# Patient Record
Sex: FEMALE | Race: WHITE | NOT HISPANIC OR LATINO | Employment: UNEMPLOYED | URBAN - METROPOLITAN AREA
[De-identification: names, ages, dates, MRNs, and addresses within clinical notes are randomized per-mention and may not be internally consistent; named-entity substitution may affect disease eponyms.]

---

## 2021-02-19 ENCOUNTER — OFFICE VISIT (OUTPATIENT)
Dept: INTERNAL MEDICINE | Facility: CLINIC | Age: 31
End: 2021-02-19

## 2021-02-19 VITALS
DIASTOLIC BLOOD PRESSURE: 60 MMHG | HEIGHT: 64 IN | OXYGEN SATURATION: 99 % | HEART RATE: 85 BPM | BODY MASS INDEX: 21.85 KG/M2 | SYSTOLIC BLOOD PRESSURE: 100 MMHG | WEIGHT: 128 LBS

## 2021-02-19 DIAGNOSIS — K21.9 GASTROESOPHAGEAL REFLUX DISEASE WITHOUT ESOPHAGITIS: ICD-10-CM

## 2021-02-19 DIAGNOSIS — R19.5 STOOL MUCUS: ICD-10-CM

## 2021-02-19 DIAGNOSIS — R11.2 NON-INTRACTABLE VOMITING WITH NAUSEA, UNSPECIFIED VOMITING TYPE: ICD-10-CM

## 2021-02-19 DIAGNOSIS — R10.9 ABDOMINAL CRAMPING: Primary | ICD-10-CM

## 2021-02-19 PROCEDURE — 99203 OFFICE O/P NEW LOW 30 MIN: CPT | Performed by: NURSE PRACTITIONER

## 2021-02-19 RX ORDER — HYDROXYZINE PAMOATE 25 MG/1
CAPSULE ORAL
COMMUNITY
Start: 2021-01-14 | End: 2021-03-17

## 2021-02-19 RX ORDER — PROMETHAZINE HYDROCHLORIDE 25 MG/1
TABLET ORAL
COMMUNITY
Start: 2021-01-14 | End: 2021-02-19 | Stop reason: SDUPTHER

## 2021-02-19 RX ORDER — PROMETHAZINE HYDROCHLORIDE 12.5 MG/1
TABLET ORAL
COMMUNITY
Start: 2020-11-22 | End: 2021-02-19 | Stop reason: DRUGHIGH

## 2021-02-19 RX ORDER — PROMETHAZINE HYDROCHLORIDE 25 MG/1
25 TABLET ORAL EVERY 8 HOURS PRN
Qty: 60 TABLET | Refills: 1 | Status: SHIPPED | OUTPATIENT
Start: 2021-02-19 | End: 2021-03-17

## 2021-02-19 RX ORDER — ONDANSETRON 4 MG/1
4 TABLET, ORALLY DISINTEGRATING ORAL EVERY 8 HOURS PRN
Qty: 60 TABLET | Refills: 1 | Status: SHIPPED | OUTPATIENT
Start: 2021-02-19 | End: 2021-04-06 | Stop reason: SDUPTHER

## 2021-02-19 RX ORDER — HYDROXYZINE HYDROCHLORIDE 10 MG/1
10 TABLET, FILM COATED ORAL EVERY 8 HOURS PRN
COMMUNITY
Start: 2020-11-22 | End: 2021-02-19 | Stop reason: DRUGHIGH

## 2021-02-19 RX ORDER — OMEPRAZOLE 20 MG/1
20 CAPSULE, DELAYED RELEASE ORAL
Qty: 30 CAPSULE | Refills: 2 | Status: SHIPPED | OUTPATIENT
Start: 2021-02-19 | End: 2021-03-19 | Stop reason: SDUPTHER

## 2021-02-19 RX ORDER — ONDANSETRON 4 MG/1
TABLET, ORALLY DISINTEGRATING ORAL
COMMUNITY
Start: 2021-01-14 | End: 2021-02-19 | Stop reason: SDUPTHER

## 2021-02-19 NOTE — PROGRESS NOTES
Chief Complaint   Patient presents with   • Establish Care   • Nausea     with abdominal Pain  Since 06/20   • Vomiting   • Constipation     alternates with diarrhea       History of Present Illness    30 y.o.female presents for establish care and GI problems.  No recent pcp services.  Nausea random with abd cramping. In mornings goes back and forth between constipation and loose stools; in afternoon only mucus for stool.  Sometimes vomiting; when really bad will have constipation mucus and vomiting all at same time and can't get up from toilet.  Onset since early June. Has treated 5 times [facility]; mult labs xrays.  Told symptoms likely related to THC. Pt states she smokes thc to help relieve nausea.  abd pain is mostly lower abd pain cramping. ;   Indigestion and heartburn. No melena. Does have hemorrhoids.      Review of Systems   Constitutional: Positive for appetite change, chills, fatigue and unexpected weight loss. Negative for fever.   Respiratory: Negative for shortness of breath.    Cardiovascular: Positive for palpitations. Negative for chest pain.   Gastrointestinal: Positive for abdominal pain, constipation, nausea, vomiting, GERD and indigestion. Negative for blood in stool.   Endocrine: Positive for polydipsia.   Genitourinary: Negative for difficulty urinating.   Musculoskeletal: Positive for arthralgias.   Skin: Negative for rash.   Neurological: Positive for dizziness.   Psychiatric/Behavioral: The patient is nervous/anxious.          [facility]  The following portions of the patient's history were reviewed and updated as appropriate: allergies, current medications, past family history, past medical history, past social history, past surgical history and problem list.     Past Medical History:   Diagnosis Date   • Anxiety    • Depression       No Known Allergies   Social History     Tobacco Use   • Smoking status: Never Smoker   • Smokeless tobacco: Never Used   Substance Use Topics   • Alcohol  "use: Never     Frequency: Never   • Drug use: Yes     Types: Marijuana         Current Outpatient Medications:   •  hydrOXYzine pamoate (VISTARIL) 25 MG capsule, TAKE 1 CAPSULE BY MOUTH EVERY 6 HOURS AS NEEDED, Disp: , Rfl:   •  ondansetron ODT (ZOFRAN-ODT) 4 MG disintegrating tablet, DISSOLVE1 TABLET BY MOUTH EVERY 8 HOURS AS NEEDED., Disp: , Rfl:   •  promethazine (PHENERGAN) 25 MG tablet, TAKE 1 TABLET BY ORAL ROUTE 4 TIMES PER DAY AS NEEDED, Disp: , Rfl:   •  hydrOXYzine (ATARAX) 10 MG tablet, Take 10 mg by mouth Every 8 (Eight) Hours As Needed., Disp: , Rfl:   •  promethazine (PHENERGAN) 12.5 MG tablet, TAKE 1 TABLET BY MOUTH EVERY 8 12 HOURS AS NEEDED FOR NAUSEA OR VOMIT (MAY CAUSE DROWSINESS), Disp: , Rfl:     VITALS:  /60   Pulse 85   Ht 162.6 cm (64\")   Wt 58.1 kg (128 lb)   LMP 02/02/2021 (Approximate)   SpO2 99%   BMI 21.97 kg/m²     Physical Exam  Vitals signs reviewed.   HENT:      Head: Normocephalic.      Right Ear: Tympanic membrane and ear canal normal.      Left Ear: Tympanic membrane, ear canal and external ear normal.      Nose: Nose normal.      Mouth/Throat:      Mouth: Mucous membranes are moist.   Eyes:      General:         Right eye: No discharge.         Left eye: No discharge.      Conjunctiva/sclera: Conjunctivae normal.      Pupils: Pupils are equal, round, and reactive to light.   Cardiovascular:      Rate and Rhythm: Normal rate and regular rhythm.      Heart sounds: Normal heart sounds.   Pulmonary:      Effort: Pulmonary effort is normal. No respiratory distress.      Breath sounds: Normal breath sounds.   Abdominal:      General: Abdomen is flat. Bowel sounds are normal. There is no distension.      Palpations: Abdomen is soft. There is no mass.      Tenderness: There is no abdominal tenderness. There is no guarding or rebound.      Hernia: No hernia is present.   Skin:     General: Skin is warm and dry.      Findings: No rash.   Neurological:      General: No focal " deficit present.      Mental Status: She is alert and oriented to person, place, and time.      Cranial Nerves: No cranial nerve deficit.      Motor: No weakness.      Gait: Gait normal.   Psychiatric:         Mood and Affect: Mood normal.         Result Review :            Assessment and Plan    Diagnoses and all orders for this visit:    1. Abdominal cramping (Primary)  -     Stool Culture (Reference Lab) - Stool, Per Rectum; Future  -     Clostridium Difficile Toxin, PCR - Stool, Per Rectum; Future  -     Fecal Leukocytes - Stool, Per Rectum; Future  -     Ova & Parasite Examination - Stool, Per Rectum; Future  -     Ova & Parasite Examination - Stool, Per Rectum; Future    2. Stool mucus  -     Stool Culture (Reference Lab) - Stool, Per Rectum; Future  -     Clostridium Difficile Toxin, PCR - Stool, Per Rectum; Future  -     Fecal Leukocytes - Stool, Per Rectum; Future  -     Ova & Parasite Examination - Stool, Per Rectum; Future  -     Ova & Parasite Examination - Stool, Per Rectum; Future  -     H. Pylori Antigen, Stool - Stool, Per Rectum; Future    3. Gastroesophageal reflux disease without esophagitis  -     Discontinue: esomeprazole (nexIUM) 20 MG capsule; Take 1 capsule by mouth Every Morning Before Breakfast.  Dispense: 30 capsule; Refill: 3  -     omeprazole (PrilOSEC) 20 MG capsule; Take 1 capsule by mouth Every Morning Before Breakfast.  Dispense: 30 capsule; Refill: 2    4. Non-intractable vomiting with nausea, unspecified vomiting type  -     ondansetron ODT (ZOFRAN-ODT) 4 MG disintegrating tablet; Place 1 tablet on the tongue Every 8 (Eight) Hours As Needed for Nausea or Vomiting.  Dispense: 60 tablet; Refill: 1  -     promethazine (PHENERGAN) 25 MG tablet; Take 1 tablet by mouth Every 8 (Eight) Hours As Needed for Nausea or Vomiting.  Dispense: 60 tablet; Refill: 1      Will obtain records from [facility] for recent labs xrays and see what all has been done so far. Check stool studies.    Discussed treatment for IBS; high fiber diet, exercises, anxiety stress control.      Follow Up {Instructions Charge Capture  Follow-up Communications :23}  Return in about 4 weeks (around 3/19/2021), or if symptoms worsen or fail to improve, for Recheck.      I discussed the patients findings and my recommendations with patient.  Patient was encouraged to keep me informed of any acute changes, lack of improvement, or any new concerning symptoms.  Patient voiced understanding of all instructions and denied further questions.    Electronically signed by:    DARREL Rahman  02/19/2021    EMR Dragon/Transcription Disclaimer:  Much of this encounter note is an electronic transcription/translation of spoken language to printed text.  The electronic translation of spoken language may permit erroneous, or at times, nonsensical words or phrases to be inadvertently transcribed.  Although I have reviewed the note for such errors, some may still exist

## 2021-02-19 NOTE — PATIENT INSTRUCTIONS
High-Fiber Diet  Fiber, also called dietary fiber, is a type of carbohydrate that is found in fruits, vegetables, whole grains, and beans. A high-fiber diet can have many health benefits. Your health care provider may recommend a high-fiber diet to help:  · Prevent constipation. Fiber can make your bowel movements more regular.  · Lower your cholesterol.  · Relieve the following conditions:  ? Swelling of veins in the anus (hemorrhoids).  ? Swelling and irritation (inflammation) of specific areas of the digestive tract (uncomplicated diverticulosis).  ? A problem of the large intestine (colon) that sometimes causes pain and diarrhea (irritable bowel syndrome, IBS).  · Prevent overeating as part of a weight-loss plan.  · Prevent heart disease, type 2 diabetes, and certain cancers.  What is my plan?  The recommended daily fiber intake in grams (g) includes:  · 38 g for men age 50 or younger.  · 30 g for men over age 50.  · 25 g for women age 50 or younger.  · 21 g for women over age 50.  You can get the recommended daily intake of dietary fiber by:  · Eating a variety of fruits, vegetables, grains, and beans.  · Taking a fiber supplement, if it is not possible to get enough fiber through your diet.  What do I need to know about a high-fiber diet?  · It is better to get fiber through food sources rather than from fiber supplements. There is not a lot of research about how effective supplements are.  · Always check the fiber content on the nutrition facts label of any prepackaged food. Look for foods that contain 5 g of fiber or more per serving.  · Talk with a diet and nutrition specialist (dietitian) if you have questions about specific foods that are recommended or not recommended for your medical condition, especially if those foods are not listed below.  · Gradually increase how much fiber you consume. If you increase your intake of dietary fiber too quickly, you may have bloating, cramping, or gas.  · Drink plenty  of water. Water helps you to digest fiber.  What are tips for following this plan?  · Eat a wide variety of high-fiber foods.  · Make sure that half of the grains that you eat each day are whole grains.  · Eat breads and cereals that are made with whole-grain flour instead of refined flour or white flour.  · Eat brown rice, bulgur wheat, or millet instead of white rice.  · Start the day with a breakfast that is high in fiber, such as a cereal that contains 5 g of fiber or more per serving.  · Use beans in place of meat in soups, salads, and pasta dishes.  · Eat high-fiber snacks, such as berries, raw vegetables, nuts, and popcorn.  · Choose whole fruits and vegetables instead of processed forms like juice or sauce.  What foods can I eat?    Fruits  Berries. Pears. Apples. Oranges. Avocado. Prunes and raisins. Dried figs.  Vegetables  Sweet potatoes. Spinach. Kale. Artichokes. Cabbage. Broccoli. Cauliflower. Green peas. Carrots. Squash.  Grains  Whole-grain breads. Multigrain cereal. Oats and oatmeal. Brown rice. Barley. Bulgur wheat. Millet. Quinoa. Bran muffins. Popcorn. Rye wafer crackers.  Meats and other proteins  Navy, kidney, and meraz beans. Soybeans. Split peas. Lentils. Nuts and seeds.  Dairy  Fiber-fortified yogurt.  Beverages  Fiber-fortified soy milk. Fiber-fortified orange juice.  Other foods  Fiber bars.  The items listed above may not be a complete list of recommended foods and beverages. Contact a dietitian for more options.  What foods are not recommended?  Fruits  Fruit juice. Cooked, strained fruit.  Vegetables  Fried potatoes. Canned vegetables. Well-cooked vegetables.  Grains  White bread. Pasta made with refined flour. White rice.  Meats and other proteins  Fatty cuts of meat. Fried chicken or fried fish.  Dairy  Milk. Yogurt. Cream cheese. Sour cream.  Fats and oils  [place].  Beverages  Soft drinks.  Other foods  Cakes and pastries.  The items listed above may not be a complete list of foods  and beverages to avoid. Contact a dietitian for more information.  Summary  · Fiber is a type of carbohydrate. It is found in fruits, vegetables, whole grains, and beans.  · There are many health benefits of eating a high-fiber diet, such as preventing constipation, lowering blood cholesterol, helping with weight loss, and reducing your risk of heart disease, diabetes, and certain cancers.  · Gradually increase your intake of fiber. Increasing too fast can result in cramping, bloating, and gas. Drink plenty of water while you increase your fiber.  · The best sources of fiber include whole fruits and vegetables, whole grains, nuts, seeds, and beans.  This information is not intended to replace advice given to you by your health care provider. Make sure you discuss any questions you have with your health care provider.  Document Revised: 10/22/2018 Document Reviewed: 10/22/2018  [employer] Patient Education © 2020 [employer]      Increase fiber in diet.  Start nexium.  miralax if constipation instead of stimulant laxative.  Stool studies.

## 2021-02-23 ENCOUNTER — TELEPHONE (OUTPATIENT)
Dept: INTERNAL MEDICINE | Facility: CLINIC | Age: 31
End: 2021-02-23

## 2021-02-23 NOTE — TELEPHONE ENCOUNTER
PT SAID SHE WAS IN THE OFFICE THE OTHER DAY AND WAS GIVEN SUPPLIES TO BRING IN A STOOL SAMPLE.  PT IS NOT SURE IF SHE IS SUPPOSED TO BRING IT IN NOW OR WAIT TIL SHE GETS A CALL FROM THE OFFICE.  PT REQUESTING CALL BACK TO CLARIFY.

## 2021-03-03 ENCOUNTER — LAB (OUTPATIENT)
Dept: LAB | Facility: HOSPITAL | Age: 31
End: 2021-03-03

## 2021-03-03 DIAGNOSIS — R10.9 ABDOMINAL CRAMPING: ICD-10-CM

## 2021-03-03 DIAGNOSIS — R19.5 STOOL MUCUS: ICD-10-CM

## 2021-03-03 LAB
C DIFF TOX GENS STL QL NAA+PROBE: NOT DETECTED
WBC STL QL MICRO: NORMAL

## 2021-03-03 PROCEDURE — 87205 SMEAR GRAM STAIN: CPT

## 2021-03-03 PROCEDURE — 87209 SMEAR COMPLEX STAIN: CPT

## 2021-03-03 PROCEDURE — 87427 SHIGA-LIKE TOXIN AG IA: CPT

## 2021-03-03 PROCEDURE — 87177 OVA AND PARASITES SMEARS: CPT

## 2021-03-03 PROCEDURE — 87046 STOOL CULTR AEROBIC BACT EA: CPT

## 2021-03-03 PROCEDURE — 87338 HPYLORI STOOL AG IA: CPT

## 2021-03-03 PROCEDURE — 87045 FECES CULTURE AEROBIC BACT: CPT

## 2021-03-03 PROCEDURE — 87493 C DIFF AMPLIFIED PROBE: CPT

## 2021-03-04 ENCOUNTER — TELEPHONE (OUTPATIENT)
Dept: INTERNAL MEDICINE | Facility: CLINIC | Age: 31
End: 2021-03-04

## 2021-03-04 NOTE — TELEPHONE ENCOUNTER
PATIENT CALLED AND SAID THAT THE MIRALAX ISNT WORKING; SHE SAID SHE IS HAVING STILL BAD STOMACH AND CONSTIPATION PAINS; SHE SAID IT GOES FROM CONSTIPATION TO DIARRHEA AND DOESN'T KNOW WHAT ELSE TO DO; PLEASE CALL TO ADVISE  PAIN SEEMS TO GET WORSE NEAR HER PERIOD, AND SHE IS GETTING READY TO START;    DELANO: [phone]

## 2021-03-05 DIAGNOSIS — R19.5 MUCOUS IN STOOLS: ICD-10-CM

## 2021-03-05 DIAGNOSIS — R11.2 NON-INTRACTABLE VOMITING WITH NAUSEA, UNSPECIFIED VOMITING TYPE: ICD-10-CM

## 2021-03-05 DIAGNOSIS — K59.00 CONSTIPATION, UNSPECIFIED CONSTIPATION TYPE: ICD-10-CM

## 2021-03-05 DIAGNOSIS — R10.9 ABDOMINAL CRAMPING: Primary | ICD-10-CM

## 2021-03-05 LAB — H PYLORI AG STL QL IA: NEGATIVE

## 2021-03-05 NOTE — TELEPHONE ENCOUNTER
Let pt know I rcvd records from [facility]. I have ordered some additional labs, if she can stop by to get done. So far stool studies negative but not all is back yet. I have also placed referral to GI specialist for eval.

## 2021-03-05 NOTE — TELEPHONE ENCOUNTER
Let pt know of message. Pt verbalized understanding.  Pt stated would be in Monday to do more labs, don't think she will make by 4 today

## 2021-03-07 LAB
BACTERIA SPEC CULT: NORMAL
BACTERIA SPEC CULT: NORMAL
CAMPYLOBACTER STL CULT: NORMAL
E COLI SXT STL QL IA: NEGATIVE
SALM + SHIG STL CULT: NORMAL

## 2021-03-08 ENCOUNTER — LAB (OUTPATIENT)
Dept: LAB | Facility: HOSPITAL | Age: 31
End: 2021-03-08

## 2021-03-08 DIAGNOSIS — R10.9 ABDOMINAL CRAMPING: ICD-10-CM

## 2021-03-08 DIAGNOSIS — R11.2 NON-INTRACTABLE VOMITING WITH NAUSEA, UNSPECIFIED VOMITING TYPE: ICD-10-CM

## 2021-03-08 DIAGNOSIS — R19.5 MUCOUS IN STOOLS: ICD-10-CM

## 2021-03-08 DIAGNOSIS — K59.00 CONSTIPATION, UNSPECIFIED CONSTIPATION TYPE: ICD-10-CM

## 2021-03-08 LAB
ALBUMIN SERPL-MCNC: 4.7 G/DL (ref 3.5–5.2)
ALBUMIN/GLOB SERPL: 2.2 G/DL
ALP SERPL-CCNC: 44 U/L (ref 39–117)
ALT SERPL W P-5'-P-CCNC: 6 U/L (ref 1–33)
ANION GAP SERPL CALCULATED.3IONS-SCNC: 10 MMOL/L (ref 5–15)
AST SERPL-CCNC: 9 U/L (ref 1–32)
BASOPHILS # BLD AUTO: 0.02 10*3/MM3 (ref 0–0.2)
BASOPHILS NFR BLD AUTO: 0.2 % (ref 0–1.5)
BILIRUB SERPL-MCNC: 0.6 MG/DL (ref 0–1.2)
BUN SERPL-MCNC: 14 MG/DL (ref 6–20)
BUN/CREAT SERPL: 18.4 (ref 7–25)
CALCIUM SPEC-SCNC: 9 MG/DL (ref 8.6–10.5)
CHLORIDE SERPL-SCNC: 105 MMOL/L (ref 98–107)
CO2 SERPL-SCNC: 25 MMOL/L (ref 22–29)
CREAT SERPL-MCNC: 0.76 MG/DL (ref 0.57–1)
DEPRECATED RDW RBC AUTO: 43.1 FL (ref 37–54)
EOSINOPHIL # BLD AUTO: 0.09 10*3/MM3 (ref 0–0.4)
EOSINOPHIL NFR BLD AUTO: 1 % (ref 0.3–6.2)
ERYTHROCYTE [DISTWIDTH] IN BLOOD BY AUTOMATED COUNT: 12 % (ref 12.3–15.4)
ERYTHROCYTE [SEDIMENTATION RATE] IN BLOOD: 2 MM/HR (ref 0–20)
GFR SERPL CREATININE-BSD FRML MDRD: 90 ML/MIN/1.73
GLOBULIN UR ELPH-MCNC: 2.1 GM/DL
GLUCOSE SERPL-MCNC: 107 MG/DL (ref 65–99)
HCT VFR BLD AUTO: 41.2 % (ref 34–46.6)
HGB BLD-MCNC: 13.7 G/DL (ref 12–15.9)
IMM GRANULOCYTES # BLD AUTO: 0.02 10*3/MM3 (ref 0–0.05)
IMM GRANULOCYTES NFR BLD AUTO: 0.2 % (ref 0–0.5)
LYMPHOCYTES # BLD AUTO: 3.3 10*3/MM3 (ref 0.7–3.1)
LYMPHOCYTES NFR BLD AUTO: 36.9 % (ref 19.6–45.3)
MCH RBC QN AUTO: 32.7 PG (ref 26.6–33)
MCHC RBC AUTO-ENTMCNC: 33.3 G/DL (ref 31.5–35.7)
MCV RBC AUTO: 98.3 FL (ref 79–97)
MONOCYTES # BLD AUTO: 0.62 10*3/MM3 (ref 0.1–0.9)
MONOCYTES NFR BLD AUTO: 6.9 % (ref 5–12)
NEUTROPHILS NFR BLD AUTO: 4.9 10*3/MM3 (ref 1.7–7)
NEUTROPHILS NFR BLD AUTO: 54.8 % (ref 42.7–76)
NRBC BLD AUTO-RTO: 0 /100 WBC (ref 0–0.2)
O+P SPEC MICRO: NORMAL
O+P STL TRI STN: NORMAL
PLATELET # BLD AUTO: 256 10*3/MM3 (ref 140–450)
PMV BLD AUTO: 11 FL (ref 6–12)
POTASSIUM SERPL-SCNC: 4.1 MMOL/L (ref 3.5–5.2)
PROT SERPL-MCNC: 6.8 G/DL (ref 6–8.5)
RBC # BLD AUTO: 4.19 10*6/MM3 (ref 3.77–5.28)
SODIUM SERPL-SCNC: 140 MMOL/L (ref 136–145)
TSH SERPL DL<=0.05 MIU/L-ACNC: 1.01 UIU/ML (ref 0.27–4.2)
WBC # BLD AUTO: 8.95 10*3/MM3 (ref 3.4–10.8)

## 2021-03-08 PROCEDURE — 85025 COMPLETE CBC W/AUTO DIFF WBC: CPT

## 2021-03-08 PROCEDURE — 84443 ASSAY THYROID STIM HORMONE: CPT

## 2021-03-08 PROCEDURE — 86255 FLUORESCENT ANTIBODY SCREEN: CPT

## 2021-03-08 PROCEDURE — 83516 IMMUNOASSAY NONANTIBODY: CPT

## 2021-03-08 PROCEDURE — 80053 COMPREHEN METABOLIC PANEL: CPT

## 2021-03-08 PROCEDURE — 85652 RBC SED RATE AUTOMATED: CPT

## 2021-03-08 PROCEDURE — 82784 ASSAY IGA/IGD/IGG/IGM EACH: CPT

## 2021-03-09 LAB
ENDOMYSIUM IGA SER QL: NEGATIVE
GLIADIN PEPTIDE IGA SER-ACNC: 3 UNITS (ref 0–19)
GLIADIN PEPTIDE IGG SER-ACNC: 2 UNITS (ref 0–19)
IGA SERPL-MCNC: 230 MG/DL (ref 87–352)
TTG IGA SER-ACNC: <2 U/ML (ref 0–3)
TTG IGG SER-ACNC: <2 U/ML (ref 0–5)

## 2021-03-10 ENCOUNTER — TELEPHONE (OUTPATIENT)
Dept: INTERNAL MEDICINE | Facility: CLINIC | Age: 31
End: 2021-03-10

## 2021-03-10 NOTE — PROGRESS NOTES
I called pt with results. Negative celiac; normal inflammatory markers. Liver kidney function good.  Keep appt with GI next week.

## 2021-03-10 NOTE — TELEPHONE ENCOUNTER
Caller: Carey Singer    Relationship: Self    Best call back number: [phone]    What test was performed: BLOOD WORK    When was the test performed: Monday 03/08    Where was the test performed: IN OFFICE    Additional notes:

## 2021-03-17 ENCOUNTER — LAB (OUTPATIENT)
Dept: LAB | Facility: HOSPITAL | Age: 31
End: 2021-03-17

## 2021-03-17 ENCOUNTER — OFFICE VISIT (OUTPATIENT)
Dept: GASTROENTEROLOGY | Facility: CLINIC | Age: 31
End: 2021-03-17

## 2021-03-17 VITALS
DIASTOLIC BLOOD PRESSURE: 68 MMHG | TEMPERATURE: 98 F | OXYGEN SATURATION: 98 % | HEIGHT: 64 IN | WEIGHT: 122.6 LBS | HEART RATE: 111 BPM | BODY MASS INDEX: 20.93 KG/M2 | RESPIRATION RATE: 14 BRPM | SYSTOLIC BLOOD PRESSURE: 98 MMHG

## 2021-03-17 DIAGNOSIS — R68.81 EARLY SATIETY: ICD-10-CM

## 2021-03-17 DIAGNOSIS — R13.19 ESOPHAGEAL DYSPHAGIA: Primary | ICD-10-CM

## 2021-03-17 DIAGNOSIS — K59.04 CHRONIC IDIOPATHIC CONSTIPATION: ICD-10-CM

## 2021-03-17 DIAGNOSIS — R11.0 NAUSEA: ICD-10-CM

## 2021-03-17 DIAGNOSIS — R19.7 DIARRHEA, UNSPECIFIED TYPE: ICD-10-CM

## 2021-03-17 DIAGNOSIS — R13.19 ESOPHAGEAL DYSPHAGIA: ICD-10-CM

## 2021-03-17 PROCEDURE — 99214 OFFICE O/P EST MOD 30 MIN: CPT | Performed by: NURSE PRACTITIONER

## 2021-03-17 PROCEDURE — 86038 ANTINUCLEAR ANTIBODIES: CPT

## 2021-03-17 PROCEDURE — 36415 COLL VENOUS BLD VENIPUNCTURE: CPT

## 2021-03-17 NOTE — PROGRESS NOTES
"     New Patient Consultation     Patient Name: Carey Singer  : 1990   MRN: [id number]     Chief Complaint:    Chief Complaint   Patient presents with   • Nausea   • Vomiting   • Constipation       History of Present Illness: Carey Singer is a 30 y.o. female who is here today for a Gastroenterology Consultation for nausea, vomiting, constipation, diarrhea.    Symptoms began about 9 months ago but seem to have worsened the past 3 weeks.  She reports lower abdominal cramping and a feeling that her right lower abdomen is \"hard\".  She is experiencing constipation and nausea.  Nausea and vomiting occur when constipation worsens.  She will then have episodes of liquid diarrhea.  She denies any weight loss or blood in her stool.  She does have mucus in her stool frequently she has tried MiraLAX for her constipation without improvement.    Also reports indigestion which has improved with omeprazole.  Admits she occasionally has difficulty swallowing liquids but this is not common.  She reports constant feeling of fullness as well as epigastric discomfort.      She has no anemia history.  There is no family history of autoimmune disease.  There is no family history of gastrointestinal cancers.  She does use marijuana but no tobacco or alcohol.    She has had a an initial normal work-up through her PCP.    Subjective      Review of Systems:   Review of Systems   Constitutional: Positive for unexpected weight gain. Negative for appetite change, fever and unexpected weight loss.   HENT: Positive for trouble swallowing.    Gastrointestinal: Negative for abdominal distention, abdominal pain, anal bleeding, blood in stool, constipation, diarrhea, nausea, rectal pain, vomiting, GERD and indigestion.   Musculoskeletal: Positive for myalgias. Negative for arthralgias and joint swelling.   Skin: Negative for dry skin and rash.       Past Medical History:   Past Medical History:   Diagnosis Date   • Anxiety    • " "Depression        Past Surgical History: History reviewed. No pertinent surgical history.    Family History:   Family History   Problem Relation Age of Onset   • No Known Problems Mother    • Diabetes Father    • Other Father    • Cancer Maternal Grandfather    • Diabetes Paternal Grandmother    • Diabetes Paternal Grandfather    • Heart disease Paternal Grandfather        Social History:   Social History     Socioeconomic History   • Marital status: Single     Spouse name: Not on file   • Number of children: Not on file   • Years of education: Not on file   • Highest education level: Not on file   Tobacco Use   • Smoking status: Never Smoker   • Smokeless tobacco: Never Used   Vaping Use   • Vaping Use: Some days   Substance and Sexual Activity   • Alcohol use: Never   • Drug use: Yes     Types: Marijuana   • Sexual activity: Yes     Partners: Male     Birth control/protection: None       Alcohol/Tobacco History:   Social History     Substance and Sexual Activity   Alcohol Use Never     Social History     Tobacco Use   Smoking Status Never Smoker   Smokeless Tobacco Never Used       Medications:     Current Outpatient Medications:   •  omeprazole (PrilOSEC) 20 MG capsule, Take 1 capsule by mouth Every Morning Before Breakfast., Disp: 30 capsule, Rfl: 2  •  ondansetron ODT (ZOFRAN-ODT) 4 MG disintegrating tablet, Place 1 tablet on the tongue Every 8 (Eight) Hours As Needed for Nausea or Vomiting., Disp: 60 tablet, Rfl: 1  •  linaclotide (LINZESS) 145 MCG capsule capsule, Take 1 capsule by mouth Every Morning Before Breakfast., Disp: 30 capsule, Rfl: 5    Allergies:   No Known Allergies    Objective     Physical Exam:  Vital Signs:   Vitals:    03/17/21 1103   BP: 98/68   BP Location: Left arm   Patient Position: Sitting   Cuff Size: Adult   Pulse: 111   Resp: 14   Temp: 98 °F (36.7 °C)   TempSrc: Temporal   SpO2: 98%   Weight: 55.6 kg (122 lb 9.6 oz)   Height: 162.6 cm (64\")     Body mass index is 21.04 kg/m². "     Physical Exam  Vitals and nursing note reviewed.   Constitutional:       General: She is not in acute distress.     Appearance: She is well-developed. She is not diaphoretic.   Eyes:      General: No scleral icterus.     Extraocular Movements:      Right eye: No nystagmus.      Left eye: No nystagmus.      Conjunctiva/sclera: Conjunctivae normal.      Pupils: Pupils are equal, round, and reactive to light.   Neck:      Thyroid: No thyromegaly.   Cardiovascular:      Rate and Rhythm: Normal rate and regular rhythm.   Pulmonary:      Effort: Pulmonary effort is normal.      Breath sounds: Normal breath sounds.   Abdominal:      General: Bowel sounds are normal. There is no distension. There are no signs of injury.      Palpations: Abdomen is soft. There is no hepatomegaly or splenomegaly.      Tenderness: There is abdominal tenderness in the right lower quadrant, suprapubic area and left lower quadrant.      Hernia: No hernia is present.   Musculoskeletal:      Cervical back: Neck supple.      Right lower leg: No edema.      Left lower leg: No edema.   Skin:     General: Skin is warm and dry.      Capillary Refill: Capillary refill takes 2 to 3 seconds.      Coloration: Skin is not jaundiced or pale.      Findings: No bruising or petechiae.      Nails: There is no clubbing.   Neurological:      Mental Status: She is alert and oriented to person, place, and time.   Psychiatric:         Behavior: Behavior normal.         Thought Content: Thought content normal.         Judgment: Judgment normal.         Assessment / Plan      Assessment/Plan:   Diagnoses and all orders for this visit:    1. Esophageal dysphagia (Primary)  -     CHARLIE With / DsDNA, RNP, Sjogrens A / B, Brewer; Future  -     Ambulatory referral for Screening EGD  Occasional dysphagia but with liquids- pt has difficulty determining if she is drinking to fast or experiencing true dysphagia.  Continue PPI.  May consider manometry in the future  2. Early  satiety  -     Ambulatory referral for Screening EGD  If nondiagnostic and does not improve with treatment of constipation, will consider gastric emptying study-follow-up in 4 weeks  3. Nausea  Possibly secondary to chronic constipation but there could be underlying gastroparesis  4. Chronic idiopathic constipation  -     linaclotide (LINZESS) 145 MCG capsule capsule; Take 1 capsule by mouth Every Morning Before Breakfast.  Dispense: 30 capsule; Refill: 5  Suspect overflow diarrhea from chronic constipation although this could be IBS mixed.  Will start treatment with Linzess and have her follow-up in 4 to 6 weeks  5. Diarrhea, unspecified type  See #4         Follow Up:   Return in about 4 weeks (around 4/14/2021), or if symptoms worsen or fail to improve.    Plan of care reviewed with the patient at the conclusion of today's visit.  Education was provided regarding diagnosis, management, and any prescribed or recommended OTC medications.  Patient verbalized understanding of and agreement with management plan.         DARREL Rodriguez  [facility]     Please note that portions of this note may have been completed with a voice recognition program. Efforts were made to edit the dictations, but occasionally words are mistranscribed.

## 2021-03-18 LAB — ANA SER QL: NEGATIVE

## 2021-03-19 ENCOUNTER — OFFICE VISIT (OUTPATIENT)
Dept: INTERNAL MEDICINE | Facility: CLINIC | Age: 31
End: 2021-03-19

## 2021-03-19 VITALS
BODY MASS INDEX: 21.31 KG/M2 | SYSTOLIC BLOOD PRESSURE: 102 MMHG | TEMPERATURE: 98 F | OXYGEN SATURATION: 99 % | HEIGHT: 64 IN | HEART RATE: 76 BPM | DIASTOLIC BLOOD PRESSURE: 64 MMHG | WEIGHT: 124.8 LBS

## 2021-03-19 DIAGNOSIS — K21.9 GASTROESOPHAGEAL REFLUX DISEASE WITHOUT ESOPHAGITIS: ICD-10-CM

## 2021-03-19 DIAGNOSIS — R10.84 GENERALIZED ABDOMINAL PAIN: Primary | ICD-10-CM

## 2021-03-19 DIAGNOSIS — K59.00 CONSTIPATION, UNSPECIFIED CONSTIPATION TYPE: ICD-10-CM

## 2021-03-19 PROCEDURE — 99213 OFFICE O/P EST LOW 20 MIN: CPT | Performed by: NURSE PRACTITIONER

## 2021-03-19 RX ORDER — OMEPRAZOLE 20 MG/1
20 CAPSULE, DELAYED RELEASE ORAL
Qty: 30 CAPSULE | Refills: 11 | Status: SHIPPED | OUTPATIENT
Start: 2021-03-19 | End: 2021-08-30 | Stop reason: SDUPTHER

## 2021-03-19 NOTE — PROGRESS NOTES
"  Chief Complaint   Patient presents with   • Abdominal Pain     follow up       History of Present Illness    30 y.o.female presents for abd pain constipation follow up.  Feeling a lot better on linzess. Stools were real watery when started, may need less. abd pain improved. Eating better. Has FU appt with GI with EGD. gerd improved; on prilosec need refill. No melena or hematochezia. Improved nausea.       ROS: any positive referenced above.   Other ROS negative.      [facility]  The following portions of the patient's history were reviewed and updated as appropriate: allergies, current medications, past family history, past medical history, past social history, past surgical history and problem list.     Past Medical History:   Diagnosis Date   • Anxiety    • Depression       No Known Allergies   Social History     Tobacco Use   • Smoking status: Never Smoker   • Smokeless tobacco: Never Used   Vaping Use   • Vaping Use: Some days   Substance Use Topics   • Alcohol use: Never   • Drug use: Yes     Types: Marijuana         Current Outpatient Medications:   •  linaclotide (LINZESS) 145 MCG capsule capsule, Take 1 capsule by mouth Every Morning Before Breakfast., Disp: 30 capsule, Rfl: 5  •  omeprazole (PrilOSEC) 20 MG capsule, Take 1 capsule by mouth Every Morning Before Breakfast., Disp: 30 capsule, Rfl: 2  •  ondansetron ODT (ZOFRAN-ODT) 4 MG disintegrating tablet, Place 1 tablet on the tongue Every 8 (Eight) Hours As Needed for Nausea or Vomiting., Disp: 60 tablet, Rfl: 1    VITALS:  /64   Pulse 76   Temp 98 °F (36.7 °C)   Ht 162.6 cm (64\")   Wt 56.6 kg (124 lb 12.8 oz)   LMP 03/09/2021   SpO2 99%   BMI 21.42 kg/m²     Physical Exam  HENT:      Head: Normocephalic.   Pulmonary:      Effort: Pulmonary effort is normal. No respiratory distress.   Abdominal:      General: Bowel sounds are normal.      Palpations: Abdomen is soft.      Tenderness: There is no abdominal tenderness.   Neurological:      Mental " Status: She is alert and oriented to person, place, and time.   Psychiatric:         Mood and Affect: Mood normal.         Result Review :            Assessment and Plan    Diagnoses and all orders for this visit:    1. Generalized abdominal pain (Primary)  Comments:  improving; keep FU with GI    2. Gastroesophageal reflux disease without esophagitis  -     omeprazole (PrilOSEC) 20 MG capsule; Take 1 capsule by mouth Every Morning Before Breakfast.  Dispense: 30 capsule; Refill: 11    3. Constipation, unspecified constipation type  Gave pt samples of linzess 72mcg to see if this dose is better for her.         Follow Up   Return if symptoms worsen or fail to improve.      I discussed the patients findings and my recommendations with patient.  Patient was encouraged to keep me informed of any acute changes, lack of improvement, or any new concerning symptoms.  Patient voiced understanding of all instructions and denied further questions.    Electronically signed by:    DARREL Rahman  03/19/2021    EMR Dragon/Transcription Disclaimer:  Much of this encounter note is an electronic transcription/translation of spoken language to printed text.  The electronic translation of spoken language may permit erroneous, or at times, nonsensical words or phrases to be inadvertently transcribed.  Although I have reviewed the note for such errors, some may still exist

## 2021-03-21 ENCOUNTER — APPOINTMENT (OUTPATIENT)
Dept: PREADMISSION TESTING | Facility: HOSPITAL | Age: 31
End: 2021-03-21

## 2021-03-21 LAB — SARS-COV-2 RNA NOSE QL NAA+PROBE: NOT DETECTED

## 2021-03-21 PROCEDURE — U0004 COV-19 TEST NON-CDC HGH THRU: HCPCS

## 2021-03-21 PROCEDURE — C9803 HOPD COVID-19 SPEC COLLECT: HCPCS

## 2021-03-24 ENCOUNTER — OUTSIDE FACILITY SERVICE (OUTPATIENT)
Dept: GASTROENTEROLOGY | Facility: CLINIC | Age: 31
End: 2021-03-24

## 2021-03-24 PROCEDURE — 43239 EGD BIOPSY SINGLE/MULTIPLE: CPT | Performed by: INTERNAL MEDICINE

## 2021-03-24 PROCEDURE — 88305 TISSUE EXAM BY PATHOLOGIST: CPT | Performed by: INTERNAL MEDICINE

## 2021-03-25 ENCOUNTER — LAB REQUISITION (OUTPATIENT)
Dept: LAB | Facility: HOSPITAL | Age: 31
End: 2021-03-25

## 2021-03-25 DIAGNOSIS — R13.10 DYSPHAGIA, UNSPECIFIED: ICD-10-CM

## 2021-03-26 LAB
CYTO UR: NORMAL
LAB AP CASE REPORT: NORMAL
LAB AP CLINICAL INFORMATION: NORMAL
PATH REPORT.FINAL DX SPEC: NORMAL
PATH REPORT.GROSS SPEC: NORMAL

## 2021-03-29 DIAGNOSIS — R11.0 NAUSEA: ICD-10-CM

## 2021-03-29 DIAGNOSIS — R68.81 EARLY SATIETY: Primary | ICD-10-CM

## 2021-04-06 DIAGNOSIS — R11.2 NON-INTRACTABLE VOMITING WITH NAUSEA, UNSPECIFIED VOMITING TYPE: ICD-10-CM

## 2021-04-06 RX ORDER — ONDANSETRON 4 MG/1
4 TABLET, ORALLY DISINTEGRATING ORAL EVERY 8 HOURS PRN
Qty: 60 TABLET | Refills: 1 | Status: SHIPPED | OUTPATIENT
Start: 2021-04-06 | End: 2021-04-27 | Stop reason: SDUPTHER

## 2021-04-06 NOTE — TELEPHONE ENCOUNTER
Last Office Visit: 3/19/21  Next Office Visit: None in chart    Labs completed in past 6 months? yes  Labs completed in past year? no    Last Refill Date: 2/19/21  Quantity: 60 tab  Refills: 1    Pharmacy: On File    Please review pended refill request for any changes needed on refills or quantities. Thank you!

## 2021-04-25 DIAGNOSIS — K59.04 CHRONIC IDIOPATHIC CONSTIPATION: ICD-10-CM

## 2021-04-27 DIAGNOSIS — R11.2 NON-INTRACTABLE VOMITING WITH NAUSEA, UNSPECIFIED VOMITING TYPE: ICD-10-CM

## 2021-04-27 RX ORDER — ONDANSETRON 4 MG/1
4 TABLET, ORALLY DISINTEGRATING ORAL EVERY 8 HOURS PRN
Qty: 60 TABLET | Refills: 1 | OUTPATIENT
Start: 2021-04-27

## 2021-04-28 NOTE — TELEPHONE ENCOUNTER
Last Office Visit: 3/19/21  Next Office Visit:None scheduled    Labs completed in past 6 months? Yes 3/8/21  Labs completed in past year? yes    Last Refill Date:4/6/21  Quantity:60  Refills:1  Spoke w/ pt, stated she has used all of medication, sometimes has to use 4 daily    Pharmacy:     Please review pended refill request for any changes needed on refills or quantities. Thank you!

## 2021-04-29 RX ORDER — ONDANSETRON 4 MG/1
4 TABLET, ORALLY DISINTEGRATING ORAL EVERY 8 HOURS PRN
Qty: 90 TABLET | Refills: 1 | Status: SHIPPED | OUTPATIENT
Start: 2021-04-29 | End: 2021-06-13 | Stop reason: SDUPTHER

## 2021-05-11 ENCOUNTER — HOSPITAL ENCOUNTER (OUTPATIENT)
Dept: NUCLEAR MEDICINE | Facility: HOSPITAL | Age: 31
Discharge: HOME | End: 2021-05-11

## 2021-05-11 DIAGNOSIS — R68.81 EARLY SATIETY: ICD-10-CM

## 2021-05-11 DIAGNOSIS — R11.0 NAUSEA: ICD-10-CM

## 2021-05-11 PROCEDURE — 0 TECHNETIUM SULFUR COLLOID: Performed by: NURSE PRACTITIONER

## 2021-05-11 PROCEDURE — 78264 GASTRIC EMPTYING IMG STUDY: CPT

## 2021-05-11 PROCEDURE — A9541 TC99M SULFUR COLLOID: HCPCS | Performed by: NURSE PRACTITIONER

## 2021-05-11 RX ADMIN — TECHNETIUM TC 99M SULFUR COLLOID 1 DOSE: KIT at 08:30

## 2021-05-12 ENCOUNTER — OFFICE VISIT (OUTPATIENT)
Dept: GASTROENTEROLOGY | Facility: CLINIC | Age: 31
End: 2021-05-12

## 2021-05-12 VITALS
TEMPERATURE: 96.7 F | HEIGHT: 64 IN | HEART RATE: 78 BPM | WEIGHT: 116 LBS | OXYGEN SATURATION: 97 % | BODY MASS INDEX: 19.81 KG/M2

## 2021-05-12 DIAGNOSIS — K31.84 GASTROPARESIS: ICD-10-CM

## 2021-05-12 DIAGNOSIS — K59.04 CHRONIC IDIOPATHIC CONSTIPATION: Primary | ICD-10-CM

## 2021-05-12 DIAGNOSIS — K20.90 ESOPHAGITIS: ICD-10-CM

## 2021-05-12 PROBLEM — R13.19 ESOPHAGEAL DYSPHAGIA: Status: ACTIVE | Noted: 2021-05-12

## 2021-05-12 PROCEDURE — 99214 OFFICE O/P EST MOD 30 MIN: CPT | Performed by: NURSE PRACTITIONER

## 2021-05-12 NOTE — PROGRESS NOTES
Follow Up      Patient Name: Carey Singer  : 1990   MRN: [id number]     Chief Complaint:    Chief Complaint   Patient presents with   • Follow-up     8 week follow up on Dysphagia; Constipation; Diarrhea;GES       History of Present Illness: Carey Singer is a 31 y.o. female who is here today for follow up on dysphagia, constipation, nausea and vomiting.    Carey reports Linzess is effective for treating her constipation but does cause liquid diarrhea.  She has not noticed any dysphagia since our last visit.  Tolerating omeprazole without side effect.  Continues to have nausea and early satiety.  Underwent gastric emptying study yesterday which did show mild gastroparesis.  ENDOSCOPY, INT (2021)  SCANNED - COLONOSCOPY (2021)  4 hr GES FINDINGS: Tracer activity within the stomach and subsequent small bowel  with gastric emptying curve analysis performed over 4 hours duration  with T1 half time 57 minutes and gastric emptying 87% just below limits  of normal consistent with delayed gastric emptying.     IMPRESSION:  Delayed gastric emptying consistent with gastroparesis.    Subjective      Review of Systems:   Review of Systems   Constitutional: Negative for appetite change, fever, unexpected weight gain and unexpected weight loss.   HENT: Negative for trouble swallowing.    Gastrointestinal: Positive for constipation, diarrhea and nausea. Negative for abdominal distention, abdominal pain, anal bleeding, blood in stool, rectal pain, vomiting, GERD and indigestion.   Musculoskeletal: Positive for myalgias. Negative for arthralgias and joint swelling.   Skin: Negative for dry skin and rash.       Medications:     Current Outpatient Medications:   •  Taylor, Zingiber officinalis, (Taylor Root) 550 MG capsule, Take 550 mg by mouth 3 (Three) Times a Day Before Meals., Disp: 90 capsule, Rfl: 11  •  linaclotide (Linzess) 72 MCG capsule capsule, Take 1 capsule by mouth Every Morning Before  "Breakfast., Disp: 30 capsule, Rfl: 5  •  omeprazole (PrilOSEC) 20 MG capsule, Take 1 capsule by mouth Every Morning Before Breakfast., Disp: 30 capsule, Rfl: 11  •  ondansetron ODT (ZOFRAN-ODT) 4 MG disintegrating tablet, Place 1 tablet on the tongue Every 8 (Eight) Hours As Needed for Nausea or Vomiting., Disp: 90 tablet, Rfl: 1    Allergies:   No Known Allergies    Social History:   Social History     Socioeconomic History   • Marital status: Single     Spouse name: Not on file   • Number of children: Not on file   • Years of education: Not on file   • Highest education level: Not on file   Tobacco Use   • Smoking status: Never Smoker   • Smokeless tobacco: Never Used   Vaping Use   • Vaping Use: Some days   • Devices: Disposable   Substance and Sexual Activity   • Alcohol use: Never   • Drug use: Yes     Types: Marijuana   • Sexual activity: Yes     Partners: Male     Birth control/protection: None        Surgical History:   History reviewed. No pertinent surgical history.     Medical History:   Past Medical History:   Diagnosis Date   • Anxiety    • Depression         Objective     Physical Exam:  Vital Signs:   Vitals:    05/12/21 0948   Pulse: 78   Temp: 96.7 °F (35.9 °C)   TempSrc: Temporal   SpO2: 97%   Weight: 52.6 kg (116 lb)   Height: 162.6 cm (64.02\")     Body mass index is 19.9 kg/m².     Physical Exam  Vitals and nursing note reviewed.   Constitutional:       General: She is not in acute distress.     Appearance: She is well-developed.   Pulmonary:      Effort: Pulmonary effort is normal. No accessory muscle usage or respiratory distress.   Skin:     Coloration: Skin is not pale.      Findings: No erythema.   Neurological:      Mental Status: She is alert and oriented to person, place, and time.   Psychiatric:         Speech: Speech normal.         Behavior: Behavior normal.         Thought Content: Thought content normal.         Judgment: Judgment normal.         Assessment / Plan  "     Assessment/Plan:   Diagnoses and all orders for this visit:    1. Chronic idiopathic constipation (Primary)  -     linaclotide (Linzess) 72 MCG capsule capsule; Take 1 capsule by mouth Every Morning Before Breakfast.  Dispense: 30 capsule; Refill: 5  Reduce Linzess to 72 due to diarrhea  2. Esophagitis  Improved on omeprazole  3. Gastroparesis  -     Taylor, Zingiber officinalis, (Taylor Root) 550 MG capsule; Take 550 mg by mouth 3 (Three) Times a Day Before Meals.  Dispense: 90 capsule; Refill: 11    Etiology: Idiopathic  We discussed the possible causes and treatments for delayed gastric emptying.  Dietary measures discussed at length and educational handout given.  Recommend discontinuation of marijuana as this will contribute to nausea.  Recommend gingerroot with meals.  Will avoid Reglan and erythromycin unless symptoms become severe and will then only use sparingly for as short of  course as possible.      Follow Up:   Return in about 6 months (around 11/12/2021), or if symptoms worsen or fail to improve.    Plan of care reviewed with the patient at the conclusion of today's visit.  Education was provided regarding diagnosis, management, and any prescribed or recommended OTC medications.  Patient verbalized understanding of and agreement with management plan.       DARREL Rodriguez  [facility] [place] [facility]     Please note that portions of this note may have been completed with a voice recognition program. Efforts were made to edit the dictations, but occasionally words are mistranscribed.

## 2021-05-21 ENCOUNTER — TELEPHONE (OUTPATIENT)
Dept: GASTROENTEROLOGY | Facility: CLINIC | Age: 31
End: 2021-05-21

## 2021-05-21 DIAGNOSIS — K59.04 CHRONIC IDIOPATHIC CONSTIPATION: Primary | ICD-10-CM

## 2021-05-21 DIAGNOSIS — K62.5 RECTAL BLEEDING: ICD-10-CM

## 2021-05-21 NOTE — TELEPHONE ENCOUNTER
Regarding: RE: Visit Follow-Up Question  Contact: [phone]  ----- Message from LUCILA Patel sent at 5/21/2021  8:47 AM EDT -----       ----- Message from Carey Singer to Dolly Mackey APRN sent at 5/21/2021  8:08 AM -----   I have not ever had a colonoscopy because I kind of have a weird fear of them. I've noticed hemorrhoids before but they haven't ever bled until this past week so it concerned me. If I need a colonoscopy I will get one or if there's another way to look then I would prefer that route.       ----- Message -----       From:DARREL Ponce       Sent:5/21/2021  7:49 AM EDT         To:Carey Singer    Subject:RE: Visit Follow-Up Question    Carey, rectal bleeding can certainly be from hemorrhoids.  You can use over-the-counter Preparation H for rectal bleeding.  I do not believe gingerroot is contributing.  Have you ever had a colonoscopy?  Rectal bleeding should always be investigated to rule out other causes for bleeding than hemorrhoids.  Dolly       ----- Message -----       From:Carey Singer       Sent:5/21/2021  7:10 AM EDT         To:DARREL Ponce    Subject:Visit Follow-Up Question    I was just in and diagnosed with gastroparesis and have been taking the luciana root. Since Sunday I've been having rectal bleeding on and off, when it comes it's quite a bit of blood. I know I've had hemorrhoids and that's probably what's bleeding but I'm wondering if there is anything that can be done for that or if it's the luciana root that's causing them to bleed now?

## 2021-05-21 NOTE — TELEPHONE ENCOUNTER
Regarding: RE: Visit Follow-Up Question  Contact: [phone]  ----- Message from LUCILA Patel sent at 5/21/2021  8:47 AM EDT -----       ----- Message from Carey Singer to Dolly Mackey APRN sent at 5/21/2021  8:08 AM -----   I have not ever had a colonoscopy because I kind of have a weird fear of them. I've noticed hemorrhoids before but they haven't ever bled until this past week so it concerned me. If I need a colonoscopy I will get one or if there's another way to look then I would prefer that route.       ----- Message -----       From:DARREL Ponce       Sent:5/21/2021  7:49 AM EDT         To:Carey Singer    Subject:RE: Visit Follow-Up Question    Carey, rectal bleeding can certainly be from hemorrhoids.  You can use over-the-counter Preparation H for rectal bleeding.  I do not believe gingerroot is contributing.  Have you ever had a colonoscopy?  Rectal bleeding should always be investigated to rule out other causes for bleeding than hemorrhoids.  Dolly       ----- Message -----       From:Carey Singer       Sent:5/21/2021  7:10 AM EDT         To:DARREL Ponce    Subject:Visit Follow-Up Question    I was just in and diagnosed with gastroparesis and have been taking the luciana root. Since Sunday I've been having rectal bleeding on and off, when it comes it's quite a bit of blood. I know I've had hemorrhoids and that's probably what's bleeding but I'm wondering if there is anything that can be done for that or if it's the luciana root that's causing them to bleed now?

## 2021-05-21 NOTE — TELEPHONE ENCOUNTER
Patient having bright red blood per rectum.  Advised need for colonoscopy.  Reassured likely hemorrhoid bleed but need to rule out alternative pathology.  Discussed procedure.  She is agreeable to having this set up now.

## 2021-05-26 DIAGNOSIS — Z12.11 SCREENING FOR COLON CANCER: Primary | ICD-10-CM

## 2021-05-26 RX ORDER — SODIUM, POTASSIUM,MAG SULFATES 17.5-3.13G
1 SOLUTION, RECONSTITUTED, ORAL ORAL TAKE AS DIRECTED
Qty: 354 ML | Refills: 0 | Status: SHIPPED | OUTPATIENT
Start: 2021-05-26

## 2021-06-03 ENCOUNTER — TELEPHONE (OUTPATIENT)
Dept: GASTROENTEROLOGY | Facility: CLINIC | Age: 31
End: 2021-06-03

## 2021-06-03 DIAGNOSIS — K31.84 GASTROPARESIS: Primary | ICD-10-CM

## 2021-06-03 RX ORDER — METOCLOPRAMIDE 5 MG/1
5 TABLET ORAL
Qty: 42 TABLET | Refills: 0 | Status: SHIPPED | OUTPATIENT
Start: 2021-06-03 | End: 2021-06-17

## 2021-06-03 NOTE — TELEPHONE ENCOUNTER
She is scheduled for a colonoscopy on the ninth.  If her rectal bleeding is severe, she should go to the emergency department.  I called in Reglan to help with her weight loss from gastroparesis.  She will take this 3 times a day for the next 2 weeks.  I do not recommend long-term use of Reglan due to the risk of involuntary muscle movements and arrhythmias.  Should she have any side effect to Reglan she should stop it immediately and notify me.  Also want her on a full liquid diet for the next few days as liquid is easier to digest.  She can try boost and shakes and Jell-O and broth...

## 2021-06-03 NOTE — TELEPHONE ENCOUNTER
Called and spoke with patient and delayed message, patient verbalized understanding no further questions or concerns,

## 2021-06-03 NOTE — TELEPHONE ENCOUNTER
Patient called saying she is still having rectal bleeding has colonoscopy 6/9/21 has dropped about 10 lbs since last appointment and 7 lbs in 2 days was 111 lbs 2 days ago today weighed 104 lbs.

## 2021-06-09 ENCOUNTER — OUTSIDE FACILITY SERVICE (OUTPATIENT)
Dept: GASTROENTEROLOGY | Facility: CLINIC | Age: 31
End: 2021-06-09

## 2021-06-09 PROCEDURE — 88305 TISSUE EXAM BY PATHOLOGIST: CPT | Performed by: INTERNAL MEDICINE

## 2021-06-09 PROCEDURE — 45380 COLONOSCOPY AND BIOPSY: CPT | Performed by: INTERNAL MEDICINE

## 2021-06-10 ENCOUNTER — LAB REQUISITION (OUTPATIENT)
Dept: LAB | Facility: HOSPITAL | Age: 31
End: 2021-06-10

## 2021-06-10 DIAGNOSIS — R19.7 DIARRHEA, UNSPECIFIED: ICD-10-CM

## 2021-06-10 DIAGNOSIS — K59.00 CONSTIPATION, UNSPECIFIED: ICD-10-CM

## 2021-06-13 DIAGNOSIS — R11.2 NON-INTRACTABLE VOMITING WITH NAUSEA, UNSPECIFIED VOMITING TYPE: ICD-10-CM

## 2021-06-13 RX ORDER — ONDANSETRON 4 MG/1
4 TABLET, ORALLY DISINTEGRATING ORAL EVERY 8 HOURS PRN
Qty: 90 TABLET | Refills: 1 | Status: SHIPPED | OUTPATIENT
Start: 2021-06-13 | End: 2021-08-29 | Stop reason: SDUPTHER

## 2021-06-13 NOTE — TELEPHONE ENCOUNTER
Last Office Visit: 03/19/21  Next Office Visit:    Labs completed in past 6 months? yes  Labs completed in past year? yes    Last Refill Date: 04/29/21  Quantity:90  Refills:1    Pharmacy:     Please review pended refill request for any changes needed on refills or quantities. Thank you!

## 2021-06-28 DIAGNOSIS — Z79.899 MEDICATION MANAGEMENT: Primary | ICD-10-CM

## 2021-07-28 DIAGNOSIS — R11.2 NON-INTRACTABLE VOMITING WITH NAUSEA, UNSPECIFIED VOMITING TYPE: ICD-10-CM

## 2021-07-28 RX ORDER — ONDANSETRON 4 MG/1
4 TABLET, ORALLY DISINTEGRATING ORAL EVERY 8 HOURS PRN
Qty: 90 TABLET | Refills: 1 | OUTPATIENT
Start: 2021-07-28

## 2021-07-28 NOTE — TELEPHONE ENCOUNTER
Called and spoke to patient and she verified she had not yet picked up the Rx from [facility] and asked if it was possible to have it transferred. I explained that usually that goes through the pharmacy so she can call and ask them to send it to [place] but if there was any problem to give us a call back.

## 2021-08-29 DIAGNOSIS — R11.2 NON-INTRACTABLE VOMITING WITH NAUSEA, UNSPECIFIED VOMITING TYPE: ICD-10-CM

## 2021-08-30 DIAGNOSIS — K59.04 CHRONIC IDIOPATHIC CONSTIPATION: ICD-10-CM

## 2021-08-30 DIAGNOSIS — K21.9 GASTROESOPHAGEAL REFLUX DISEASE WITHOUT ESOPHAGITIS: ICD-10-CM

## 2021-08-30 DIAGNOSIS — R11.2 NON-INTRACTABLE VOMITING WITH NAUSEA, UNSPECIFIED VOMITING TYPE: ICD-10-CM

## 2021-08-30 RX ORDER — ONDANSETRON 4 MG/1
4 TABLET, ORALLY DISINTEGRATING ORAL EVERY 8 HOURS PRN
Qty: 90 TABLET | Refills: 0 | Status: SHIPPED | OUTPATIENT
Start: 2021-08-30

## 2021-08-30 RX ORDER — ONDANSETRON 4 MG/1
4 TABLET, ORALLY DISINTEGRATING ORAL EVERY 8 HOURS PRN
Qty: 90 TABLET | Refills: 0 | Status: SHIPPED | OUTPATIENT
Start: 2021-08-30 | End: 2021-08-30 | Stop reason: SDUPTHER

## 2021-08-30 RX ORDER — OMEPRAZOLE 20 MG/1
20 CAPSULE, DELAYED RELEASE ORAL
Qty: 30 CAPSULE | Refills: 0 | Status: SHIPPED | OUTPATIENT
Start: 2021-08-30

## 2021-08-30 RX ORDER — OMEPRAZOLE 20 MG/1
20 CAPSULE, DELAYED RELEASE ORAL
Qty: 30 CAPSULE | Refills: 11 | OUTPATIENT
Start: 2021-08-30

## 2021-08-30 RX ORDER — ONDANSETRON 4 MG/1
4 TABLET, ORALLY DISINTEGRATING ORAL EVERY 8 HOURS PRN
Qty: 90 TABLET | Refills: 0 | OUTPATIENT
Start: 2021-08-30

## 2021-08-30 NOTE — TELEPHONE ENCOUNTER
Hub to read: Sent message for pt to schedule annual exam w/ [facility] for refills. Once made will send rfs to cover until that time.